# Patient Record
Sex: FEMALE | URBAN - METROPOLITAN AREA
[De-identification: names, ages, dates, MRNs, and addresses within clinical notes are randomized per-mention and may not be internally consistent; named-entity substitution may affect disease eponyms.]

---

## 2023-09-19 ENCOUNTER — WEB ENCOUNTER (OUTPATIENT)
Dept: URBAN - METROPOLITAN AREA CLINIC 37 | Facility: CLINIC | Age: 53
End: 2023-09-19

## 2023-09-22 ENCOUNTER — OFFICE VISIT (OUTPATIENT)
Dept: URBAN - METROPOLITAN AREA CLINIC 37 | Facility: CLINIC | Age: 53
End: 2023-09-22

## 2023-09-22 NOTE — PHYSICAL EXAM CHEST:
no lesions, no deformities, no traumatic injuries, no significant scars are present, chest wall non-tender, no masses present, breathing is unlabored without accessory muscle use,normal breath sounds
Pertinent Imaging studies reviewed and interpreted. (See EMR for details)  See electronic record for medications ordered. History from : Patient    Limitations to history : None    Chronic Conditions:   Past Medical History:   Diagnosis Date    Headache(784.0)     Hypertension        CONSULTS: (Who and What was discussed)  None    Discussion with Other Profesionals : None    Social Determinants : None    Records Reviewed : None    CC/HPI Summary, DDx, ED Course, and Reassessment: See HPI and above for full presentation physical exam.    Patient is a very pleasant 57-year-old female who presents the ED today after knee injury yesterday. 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 Is been painful ever since. Took some Tylenol without relief and ice to quite liberally overnight. States that she still having pain therefore came to the ED. No numbness or tingling distally. Has been ambulatory. On arrival she is afebrile and hemodynamically stable. Nontoxic in appearance. No joint instability of the right knee, negative anterior drawer test.  Extensor mechanism is intact. Some mild swelling and some infra patellar tenderness of the knee. No overlying rashes lesions or abrasions. Full active and passive range of motion. Has been ambulatory. Pedal pulses 2+. Capillary refill less than 3 seconds. We will obtain plain films, medicate with some naproxen and reevaluate. Differential diagnosis: includes but not limited to Meniscus tear, ACL tear, other ligamental injury or strain, patellar fracture, tibial plateau fracture, extensor mechanism rupture, dislocation, Arterial Injury/Ischemia, Infection, Neurologic Deficit/Injury. No evidence of neurovascular injury on exam.  Plain films as above, I note some generalized swelling, no overt fracture or dislocation, verified with radiology read, see full radiology read as above.   I

## 2023-10-16 ENCOUNTER — OFFICE VISIT (OUTPATIENT)
Dept: URBAN - METROPOLITAN AREA CLINIC 37 | Facility: CLINIC | Age: 53
End: 2023-10-16